# Patient Record
Sex: MALE | Race: BLACK OR AFRICAN AMERICAN | NOT HISPANIC OR LATINO
[De-identification: names, ages, dates, MRNs, and addresses within clinical notes are randomized per-mention and may not be internally consistent; named-entity substitution may affect disease eponyms.]

---

## 2021-08-03 ENCOUNTER — TRANSCRIPTION ENCOUNTER (OUTPATIENT)
Age: 56
End: 2021-08-03

## 2022-02-17 DIAGNOSIS — D12.6 BENIGN NEOPLASM OF COLON, UNSPECIFIED: ICD-10-CM

## 2022-11-03 ENCOUNTER — NON-APPOINTMENT (OUTPATIENT)
Age: 57
End: 2022-11-03

## 2022-11-11 ENCOUNTER — NON-APPOINTMENT (OUTPATIENT)
Age: 57
End: 2022-11-11

## 2022-11-18 ENCOUNTER — NON-APPOINTMENT (OUTPATIENT)
Age: 57
End: 2022-11-18

## 2022-12-31 ENCOUNTER — NON-APPOINTMENT (OUTPATIENT)
Age: 57
End: 2022-12-31

## 2023-07-25 ENCOUNTER — APPOINTMENT (OUTPATIENT)
Dept: ORTHOPEDIC SURGERY | Facility: CLINIC | Age: 58
End: 2023-07-25
Payer: COMMERCIAL

## 2023-07-25 DIAGNOSIS — S62.664A NONDISPLACED FRACTURE OF DISTAL PHALANX OF RIGHT RING FINGER, INITIAL ENCOUNTER FOR CLOSED FRACTURE: ICD-10-CM

## 2023-07-25 DIAGNOSIS — S62.636A DISPLACED FRACTURE OF DISTAL PHALANX OF RIGHT LITTLE FINGER, INITIAL ENCOUNTER FOR CLOSED FRACTURE: ICD-10-CM

## 2023-07-25 PROCEDURE — 73130 X-RAY EXAM OF HAND: CPT | Mod: RT

## 2023-07-25 PROCEDURE — 99203 OFFICE O/P NEW LOW 30 MIN: CPT | Mod: 25

## 2023-07-25 PROCEDURE — 29130 APPL FINGER SPLINT STATIC: CPT | Mod: RT

## 2023-07-25 NOTE — ASSESSMENT
[FreeTextEntry1] : Xrays reviewed with patient\par Treatment options discussed \par Placed in splint\par otc anti-inflammatories / tylenol as needed \par Follow up with hand specialist this week\par
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
## 2023-07-25 NOTE — PHYSICAL EXAM
[4th Finger] : 4th finger [5th Finger] : 5th finger [4th] : 4th [5th] : 5th [Proximal Phalanx] : proximal phalanx [DIP Joint] : DIP joint [Distal Phalanx] : distal phalanx [NL (75)] : Dorsiflexion 75 degrees [NL (85)] : volarflexion 85 degrees [5___] : volarflexion 5[unfilled]/5 [Right] : right hand [Fracture] : Fracture [] : no erythema [FreeTextEntry1] : 5th - avulsion fx distal phalanx\par 4th - ?nondisplaced distal phalanx fx?

## 2023-07-28 ENCOUNTER — APPOINTMENT (OUTPATIENT)
Dept: ORTHOPEDIC SURGERY | Facility: CLINIC | Age: 58
End: 2023-07-28
Payer: COMMERCIAL

## 2023-07-28 PROCEDURE — 99214 OFFICE O/P EST MOD 30 MIN: CPT

## 2023-07-28 PROCEDURE — 73140 X-RAY EXAM OF FINGER(S): CPT | Mod: RT

## 2023-07-28 NOTE — IMAGING
[de-identified] : RIGHT HAND\par skin intact. mild swelling of DIPJ of RF/SF.\par TTP to DIPJ of RF/SF.\par RF: good extension, flex to mid-palm.\par SF: DIPJ ext lag. flex to mid-palm.\par good flex/ext other digits. \par SILT to median, ulnar, radial distribution. \par brisk cap refill all digits.\par no triggering.\par \par \par XRAYS OF RIGHT HAND 7/25/23: displaced small finger distal phalanx dorsal base intra-articular fx. minimally displaced ring finger distal phalanx dorsal base fx. djd of thumb CMCJ. djd of DRUJ. well-corticated ossification adjacent to ulnar styloid\par XRAYS OF RIGHT RING FINGER: stable position/alignment of distal phalanx dorsal base intra-articular fx.\par XRAYS OF RIGHT SMALL FINGER: stable position/alignment of distal phalanx dorsal base intra-articular fx.
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
## 2023-07-28 NOTE — ASSESSMENT
[FreeTextEntry1] : The condition was explained to the patient.\par Discussed risks and benefits of surgical vs non-surgical treatment of mallet finger. Plan to proceed with non-surgical treatment.\par Discussed that there will be a permanent bump/deformity at the fracture site. Discussed risk of post-traumatic arthritis, which can cause pain, stiffness, weakness.\par Discussed possible outcomes - extension to neutral, no improvement in extensor lag, or partial improvement in extensor lag (most likely). Discussed risk of skin irritation, breakdown, and sensitivity as well as joint stiffness and loss of ROM due to prolonged immobilization.\par \par - Prescribed OT custom mallet splints for ring and small fingers. Applied stack splints (size 4 ring finger and small fiinger) in the interim. Anticipate 6wks full time, 4wks part time. Reviewed splint care and hygiene tips. - Demonstrated PIPJ flexion exercises to reduce adjacent joint stiffness.\par - Light activity.\par \par F/u 1 week. X-rays R ring and small fingers in splints.\par

## 2023-08-02 ENCOUNTER — APPOINTMENT (OUTPATIENT)
Dept: ORTHOPEDIC SURGERY | Facility: CLINIC | Age: 58
End: 2023-08-02
Payer: COMMERCIAL

## 2023-08-02 VITALS — BODY MASS INDEX: 29.82 KG/M2 | WEIGHT: 190 LBS | HEIGHT: 67 IN

## 2023-08-02 DIAGNOSIS — S62.636A DISPLACED FRACTURE OF DISTAL PHALANX OF RIGHT LITTLE FINGER, INITIAL ENCOUNTER FOR CLOSED FRACTURE: ICD-10-CM

## 2023-08-02 DIAGNOSIS — S62.634A DISPLACED FRACTURE OF DISTAL PHALANX OF RIGHT RING FINGER, INITIAL ENCOUNTER FOR CLOSED FRACTURE: ICD-10-CM

## 2023-08-02 PROCEDURE — 73140 X-RAY EXAM OF FINGER(S): CPT | Mod: RT

## 2023-08-02 PROCEDURE — 99213 OFFICE O/P EST LOW 20 MIN: CPT

## 2023-08-02 NOTE — ASSESSMENT
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 Reviewed splint care and hygiene tips. continue PIPJ flexion exercises to reduce adjacent joint stiffness. - Light activity.  F/u 5 weeks. X-rays R ring and small fingers out of splints.
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
## 2023-08-02 NOTE — IMAGING
[de-identified] : RIGHT HAND skin intact. mild swelling of DIPJ of RF/SF. RF: DIPJ in splint. PIPJ good flex/ext. SF: DIPJ in splint. PIPJ good flex/ext. good flex/ext other digits.  SILT to median, ulnar, radial distribution.  brisk cap refill all digits. no triggering.   XRAYS OF RIGHT RING FINGER: stable position/alignment of distal phalanx dorsal base intra-articular fx. concentric DIPJ. XRAYS OF RIGHT SMALL FINGER: stable position/alignment of distal phalanx dorsal base intra-articular fx. concentric DIPJ.

## 2023-08-22 ENCOUNTER — APPOINTMENT (OUTPATIENT)
Dept: ORTHOPEDIC SURGERY | Facility: CLINIC | Age: 58
End: 2023-08-22

## 2023-09-05 ENCOUNTER — APPOINTMENT (OUTPATIENT)
Dept: ORTHOPEDIC SURGERY | Facility: CLINIC | Age: 58
End: 2023-09-05

## 2023-09-05 VITALS — WEIGHT: 190 LBS | BODY MASS INDEX: 29.82 KG/M2 | HEIGHT: 67 IN

## 2023-09-06 ENCOUNTER — APPOINTMENT (OUTPATIENT)
Dept: ORTHOPEDIC SURGERY | Facility: CLINIC | Age: 58
End: 2023-09-06

## 2023-11-19 ENCOUNTER — APPOINTMENT (OUTPATIENT)
Dept: ORTHOPEDIC SURGERY | Facility: CLINIC | Age: 58
End: 2023-11-19
Payer: COMMERCIAL

## 2023-11-19 VITALS — HEIGHT: 67 IN | WEIGHT: 190 LBS | BODY MASS INDEX: 29.82 KG/M2

## 2023-11-19 PROCEDURE — 20610 DRAIN/INJ JOINT/BURSA W/O US: CPT | Mod: LT

## 2023-11-19 PROCEDURE — J3490M: CUSTOM

## 2023-11-19 PROCEDURE — 99203 OFFICE O/P NEW LOW 30 MIN: CPT | Mod: 25

## 2023-11-19 PROCEDURE — 73030 X-RAY EXAM OF SHOULDER: CPT | Mod: LT

## 2023-11-27 ENCOUNTER — APPOINTMENT (OUTPATIENT)
Dept: ORTHOPEDIC SURGERY | Facility: CLINIC | Age: 58
End: 2023-11-27

## 2023-12-24 ENCOUNTER — APPOINTMENT (OUTPATIENT)
Dept: ORTHOPEDIC SURGERY | Facility: CLINIC | Age: 58
End: 2023-12-24
Payer: COMMERCIAL

## 2023-12-24 VITALS — HEIGHT: 67 IN | WEIGHT: 190 LBS | BODY MASS INDEX: 29.82 KG/M2

## 2023-12-24 PROCEDURE — 99203 OFFICE O/P NEW LOW 30 MIN: CPT

## 2023-12-24 RX ORDER — MELOXICAM 15 MG/1
15 TABLET ORAL DAILY
Qty: 30 | Refills: 0 | Status: ACTIVE | COMMUNITY
Start: 2023-12-24 | End: 1900-01-01

## 2023-12-24 NOTE — ASSESSMENT
[FreeTextEntry1] : A/P L shoulder tendonitis - MRI r/o RC tear - NSAIDS prn - tylenol prn - ice - OT - WBAT - f/u shoulder surgery after MRI

## 2023-12-24 NOTE — IMAGING
[de-identified] : PE L shoulder: skin intact, +tenderness of anterior shoulder, AROM , abd 90, PROM FF 90, abd 90, RC intact but weak, biceps intact, NVI. no neck tenderness.

## 2023-12-24 NOTE — HISTORY OF PRESENT ILLNESS
[Left Arm] : left arm [Gradual] : gradual [6] : 6 [Localized] : localized [Constant] : constant [Injection therapy] : injection therapy [de-identified] : 57 y/o RHD M with atraumatic L shoulder pain x 2 days. denies fevers/chills. denies numbness/tingling. denies neck pain. He had shoulder tendonitis about 6 weeks ago with relief after cortisone injection. he did not do formal PT, but has been doing home exercises in gym. h/o GERD on famotidine [] : no [FreeTextEntry1] : shoulder  [de-identified] : motion [de-identified] : Gil [de-identified] : 11/19/23 [de-identified] : x-ray [de-identified] : cortisone injection

## 2023-12-30 ENCOUNTER — APPOINTMENT (OUTPATIENT)
Dept: ORTHOPEDIC SURGERY | Facility: CLINIC | Age: 58
End: 2023-12-30
Payer: COMMERCIAL

## 2023-12-30 VITALS — WEIGHT: 190 LBS | BODY MASS INDEX: 29.82 KG/M2 | HEIGHT: 67 IN

## 2023-12-30 DIAGNOSIS — M77.8 OTHER ENTHESOPATHIES, NOT ELSEWHERE CLASSIFIED: ICD-10-CM

## 2023-12-30 PROCEDURE — 99203 OFFICE O/P NEW LOW 30 MIN: CPT

## 2023-12-30 RX ORDER — METHYLPREDNISOLONE 4 MG/1
4 TABLET ORAL
Qty: 1 | Refills: 0 | Status: ACTIVE | COMMUNITY
Start: 2023-12-30 | End: 1900-01-01

## 2023-12-30 NOTE — IMAGING
[de-identified] :   ----------------------------------------------------------------------------   Left shoulder exam:    Skin: no significant pertinent finding  Inspection: no obvious deformity, no obvious masses, no swelling, no effusion, no atrophy  ROM:     FF: 170     ER: 70     IR: T12  Tenderness:     (+) Anterior/Biceps:     (neg) Posterior     (neg) Lateral     (neg) Trapezius     (neg) Scapula     (neg) AC joint     (neg) Crepitus with ROM  Additional tests:     (+) Neer's     (+)Hawkin's     (neg) Ross's     (neg) Speed     (neg) Cross chest adduction  Strength:     FF: 4/5     ER: 4/5     IR: 5/5     Biceps: 5/5     Triceps: 5/5     Distal: 5/5  Neuro: In tact to light touch throughout  Vascularity: Extremity warm and well perfused  [Left] : left shoulder [There are no fractures, subluxations or dislocations. No significant abnormalities are seen] : There are no fractures, subluxations or dislocations. 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

## 2023-12-30 NOTE — DISCUSSION/SUMMARY
[Medication Risks Reviewed] : Medication risks reviewed [de-identified] : MDP  fu p mri left shoulder  may also require mri CS if no cuff pathology seen ddx: brachial plexopathy  ----------------------------------------------------------------------------  All relevant imaging studies pertinent to today's visit, including x-rays, MRI's and/or other advanced imaging studies (CT/etc) were independently interpreted and reviewed with the patient as needed. Implications of the studies together with the patient's clinical picture were discussed to formulate a working diagnosis and management options were detailed.  The patient was advised of the diagnosis.  The natural history of the pathology was explained in full. All questions were answered.  The risks and benefits of conservative and interventional treatment alternatives were explained to the patient  ----------------------------------------------------------------------------  The patient was advised that an advanced diagnostic/imaging study (MRI/CT/etc) will be ordered to evaluate potential pathology in the affected area(s).  The patient was further advised to follow up in the office to review the results of the study and determine further management that may be indicated.

## 2023-12-30 NOTE — HISTORY OF PRESENT ILLNESS
[Left Arm] : left arm [7] : 7 [8] : 8 [Radiating] : radiating [Tingling] : tingling [Constant] : constant [Nothing helps with pain getting better] : Nothing helps with pain getting better [de-identified] : 12/30/23: pt is a 57 y/o male with pain in his left shoulder. 57 y/o RHD M with atraumatic L shoulder pain for a about a week. pt says pain has significantly worsened, especially at night. denies fevers/chills. pt says pain radiates down to his fingers where he feels numbness and tingling. denies neck pain.  no recent injury  He had shoulder tendonitis about 7 weeks ago with relief after cortisone injection. he did not do formal PT, but has been doing home exercises in gym. pt has been taking meloxicam and says it barely helps from UC that he visited 7 days ago h/o GERD on famotidine  hx of DELMY 15 years ago [] : no [FreeTextEntry1] : shoulder  [de-identified] : motion  [de-identified] : MINI Ash [de-identified] : x-ray  [de-identified] : meloxicam

## 2024-01-02 ENCOUNTER — APPOINTMENT (OUTPATIENT)
Dept: MRI IMAGING | Facility: CLINIC | Age: 59
End: 2024-01-02
Payer: COMMERCIAL

## 2024-01-02 PROCEDURE — 73221 MRI JOINT UPR EXTREM W/O DYE: CPT | Mod: LT

## 2024-01-03 ENCOUNTER — APPOINTMENT (OUTPATIENT)
Dept: ORTHOPEDIC SURGERY | Facility: CLINIC | Age: 59
End: 2024-01-03
Payer: COMMERCIAL

## 2024-01-03 VITALS — WEIGHT: 190 LBS | HEIGHT: 67 IN | BODY MASS INDEX: 29.82 KG/M2

## 2024-01-03 DIAGNOSIS — M75.122 COMPLETE ROTATOR CUFF TEAR OR RUPTURE OF LEFT SHOULDER, NOT SPECIFIED AS TRAUMATIC: ICD-10-CM

## 2024-01-03 PROCEDURE — 99214 OFFICE O/P EST MOD 30 MIN: CPT

## 2024-01-03 RX ORDER — DICLOFENAC SODIUM 75 MG/1
75 TABLET, DELAYED RELEASE ORAL
Qty: 30 | Refills: 0 | Status: ACTIVE | COMMUNITY
Start: 2024-01-03 | End: 1900-01-01

## 2024-01-03 NOTE — DISCUSSION/SUMMARY
[Medication Risks Reviewed] : Medication risks reviewed [de-identified] : Reviewed MRI Left shoulder, Pt does have significant supraspinatus tendinopathy and possible area of full tearing, however, given no injury and significant shoulder weakness and disfunction not fully explained  by MRI and out of proportion will rec EMG and MRI c spine prior to surgical repair for rotator cuff Obtain EMG Obtain MRI cervical spine f/u p MRI / EMG  ----------------------------------------------------------------------------  The patient was advised that an advanced diagnostic/imaging study (MRI/CT/etc) will be ordered to evaluate potential pathology in the affected area(s).  The patient was further advised to follow up in the office to review the results of the study and determine further management that may be indicated.  ----------------------------------------------------------------------------  All relevant imaging studies pertinent to today's visit, including x-rays, MRI's and/or other advanced imaging studies (CT/etc) were independently interpreted and reviewed with the patient as needed. Implications of the studies together with the patient's clinical picture were discussed to formulate a working diagnosis and management options were detailed.  The patient was advised of the diagnosis.  The natural history of the pathology was explained in full. All questions were answered.  The risks and benefits of conservative and interventional treatment alternatives were explained to the patient

## 2024-01-03 NOTE — HISTORY OF PRESENT ILLNESS
[Left Arm] : left arm [8] : 8 [7] : 7 [Radiating] : radiating [Tingling] : tingling [Constant] : constant [Nothing helps with pain getting better] : Nothing helps with pain getting better [de-identified] : 12/30/23: pt is a 59 y/o male with pain in his left shoulder. 59 y/o RHD M with atraumatic L shoulder pain for a about a week. pt says pain has significantly worsened, especially at night. denies fevers/chills. pt says pain radiates down to his fingers where he feels numbness and tingling. denies neck pain.  no recent injury  He had shoulder tendonitis about 7 weeks ago with relief after cortisone injection. he did not do formal PT, but has been doing home exercises in gym. pt has been taking meloxicam and says it barely helps from UC that he visited 7 days ago h/o GERD on famotidine  hx of DELMY 15 years ago [] : no [FreeTextEntry1] : shoulder  [de-identified] : motion  [de-identified] : MINI Ash [de-identified] : MRI [de-identified] : x-ray

## 2024-01-03 NOTE — DATA REVIEWED
[MRI] : MRI [Left] : left [Shoulder] : shoulder [I independently reviewed and interpreted images and report] : I independently reviewed and interpreted images and report [FreeTextEntry1] : moderate bursitis, severe rotator cuff tendinopathy, partial tearing.- ?near complete

## 2024-01-03 NOTE — IMAGING
[Left] : left shoulder [There are no fractures, subluxations or dislocations. No significant abnormalities are seen] : There are no fractures, subluxations or dislocations. No significant abnormalities are seen [de-identified] :   ----------------------------------------------------------------------------   Left shoulder exam:    Skin: no significant pertinent finding  Inspection: no obvious deformity, no obvious masses, no swelling, no effusion, no atrophy  ROM:     FF: 150 with pain     ER: 70     IR: T12  Tenderness:     (+) Anterior/Biceps:     (neg) Posterior     (neg) Lateral     (neg) Trapezius     (neg) Scapula     (neg) AC joint     (neg) Crepitus with ROM  Additional tests:     (+) Neer's     (+)Hawkin's     (neg) Ross's     (neg) Speed     (neg) Cross chest adduction  Strength:     FF: 4/5     ER: 4/5     IR: 5/5     Biceps: 5/5     Triceps: 5/5     Distal: 5/5  Neuro: In tact to light touch throughout  Vascularity: Extremity warm and well perfused  [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

## 2024-01-04 ENCOUNTER — APPOINTMENT (OUTPATIENT)
Dept: NEUROLOGY | Facility: CLINIC | Age: 59
End: 2024-01-04
Payer: COMMERCIAL

## 2024-01-04 PROCEDURE — 95886 MUSC TEST DONE W/N TEST COMP: CPT

## 2024-01-04 PROCEDURE — 95911 NRV CNDJ TEST 9-10 STUDIES: CPT

## 2024-01-05 ENCOUNTER — APPOINTMENT (OUTPATIENT)
Dept: MRI IMAGING | Facility: CLINIC | Age: 59
End: 2024-01-05
Payer: COMMERCIAL

## 2024-01-05 PROCEDURE — 72141 MRI NECK SPINE W/O DYE: CPT

## 2024-01-10 ENCOUNTER — APPOINTMENT (OUTPATIENT)
Dept: ORTHOPEDIC SURGERY | Facility: CLINIC | Age: 59
End: 2024-01-10
Payer: COMMERCIAL

## 2024-01-10 VITALS — WEIGHT: 190 LBS | HEIGHT: 67 IN | BODY MASS INDEX: 29.82 KG/M2

## 2024-01-10 PROCEDURE — 99215 OFFICE O/P EST HI 40 MIN: CPT

## 2024-01-10 NOTE — PHYSICAL EXAM
[Left] : left shoulder [4 ___] : forward flexion 4[unfilled]/5 [4___] : external rotation 4[unfilled]/5 [] : pain with strength testing [FreeTextEntry9] : FE 140P ER 45P

## 2024-01-10 NOTE — ASSESSMENT
[FreeTextEntry1] : L RCT. Cervical HNP with radiculopathy. xrays and advanced images reviewed. MRI L shoulder: full thickness supra tear, ac joint arthrosis, bursitis MRI C-spine: multi level disc bulge and herniation, central and foraminal stenosis EMG L UE: acute and chronic L C6-7 radiculopathy Discussed op versus non op tx, including the r/b/a/c of both. Discussed rehab and recovery after SA, SAD, acromioplasty, synovectomy, GHD, RCR. Discussed possible biceps tenodesis versus tenotomy pending evaluation intra op.  Recommend consult with Dr. Khanna and treatment of c-spine prior to proceeding with shoulder surgery.

## 2024-01-10 NOTE — HISTORY OF PRESENT ILLNESS
[10] : 10 [7] : 7 [Dull/Aching] : dull/aching [Sharp] : sharp [Shooting] : shooting [Stabbing] : stabbing [Tingling] : tingling [de-identified] : 1/10/24: 57 yo RHD male with left shoulder pain since about 2022. Denies specific injury but states he lifts weights at the gym. He has difficulty raising his arm overhead. There is pain deep in his shoulder. He does have neck injury which causes numbness/tingling down his arm to his hand. He had cervical KIRSTEN in the past. He saw Dr. Cardoso and had shoulder and c-spine MRIs and EMG. He had CSI L shoulder with good but temporary relief. He takes diclofenac.  PMHx: HCM  MRI L shoulder: full thickness supra tear, ac joint arthrosis, bursitis  MRI C-spine:multi level disc bulge and herniation, central and foraminal stenosis  EMG L UE: acute and chronic L C6-7 radiculopathy [FreeTextEntry1] : left shoulder

## 2024-01-10 NOTE — DATA REVIEWED
[EMG Nerve Conduction] : A EMG Nerve Conduction test was completed of the [Positive] : positive [MRI] : MRI [Left] : left [Shoulder] : shoulder [Cervical Spine] : cervical spine [FreeTextEntry1] : MRI L shoulder: full thickness supra tear, ac joint arthrosis, bursitis MRI C-spine:multi level disc bulge and herniation, central and foraminal stenosis

## 2024-01-11 ENCOUNTER — APPOINTMENT (OUTPATIENT)
Dept: PAIN MANAGEMENT | Facility: CLINIC | Age: 59
End: 2024-01-11
Payer: COMMERCIAL

## 2024-01-11 VITALS — BODY MASS INDEX: 29.82 KG/M2 | HEIGHT: 67 IN | WEIGHT: 190 LBS

## 2024-01-11 DIAGNOSIS — I42.1 OBSTRUCTIVE HYPERTROPHIC CARDIOMYOPATHY: ICD-10-CM

## 2024-01-11 DIAGNOSIS — M54.12 RADICULOPATHY, CERVICAL REGION: ICD-10-CM

## 2024-01-11 PROCEDURE — 99204 OFFICE O/P NEW MOD 45 MIN: CPT

## 2024-01-11 RX ORDER — GABAPENTIN 300 MG/1
300 CAPSULE ORAL
Qty: 60 | Refills: 2 | Status: ACTIVE | COMMUNITY
Start: 2024-01-11 | End: 1900-01-01

## 2024-01-11 NOTE — PHYSICAL EXAM
[] : no paracervical tenderness [Extension] : extension [3___] : left triceps 3[unfilled]/5 [de-identified] : extension 10 degrees

## 2024-01-11 NOTE — ASSESSMENT
[FreeTextEntry1] : After discussing various treatment options with the patient including but not limited to oral medications, physical therapy, exercise, modalities as well as interventional spinal injections, we have decided with the following plan:  1) Intervention Injection Therapy: I personally reviewed the MRI/CT scan images and agree with the radiologist's report. The radiological findings were discussed with the patient. The risks, benefits, contents and alternatives to injection were explained in full to the patient. Risks outlined include but are not limited to infection,sepsis, bleeding, post-dural puncture headache, nerve damage, temporary increase in pain, syncopal episode, failure to resolve symptoms, allergic reaction, symptom recurrence, and elevation of blood sugar in diabetics. Cortisone may cause immunosuppression. Patient understands the risks. All questions were answered. After discussion of options, patient requested an injection. Information regarding the injection was given to the patient. Which medications to stop prior to the injection was explained to the patient as well.  Follow up in 1-2 weeks post injection for re-evaluation.  Continue Home exercises, stretching, activity modification, physical therapy, and conservative care. Patient is presenting with acute/sub-acute radicular pain with impairment in ADLs and functionality.  The pain has not responded sufficiently to  conservative care including nsaid therapy and/or physical therapy.  There is no bleeding tendency, unstable medical condition, or systemic infection. The purpose of the spinal injections is to facilitate active therapy by providing short term relief through reduction of pain and inflammation.   Injections, by themselves, are not likely to provide long-term relief. Rather, active rehabilitation with modified work achieves long-term relief by increasing active ROM, strength and stability.   DELMY   2) I would recommend a trial of neuropathic medication as patient presents with signs of nerve irritation. (ie burning, paresthesias etc) Goals of therapy would be to improve pain and overall QOL. Side effects reviewed with patient. Patient will call or stop medication if given side effects occur.   gabapentin

## 2024-01-11 NOTE — HISTORY OF PRESENT ILLNESS
[Neck] : neck [8] : 8 [10] : 10 [Radiating] : radiating [Stabbing] : stabbing [Throbbing] : throbbing [Intermittent] : intermittent [Household chores] : household chores [Leisure] : leisure [Work] : work [Sleep] : sleep [Social interactions] : social interactions [Nothing helps with pain getting better] : Nothing helps with pain getting better [Lying in bed] : lying in bed [FreeTextEntry1] : 01/11/2024 : Patient presents for initial evaluation. He is having pain on his neck and left arm.   he noticed pain when he started getting his hair cut. gets a pulsating in the neck and down the left arm to the fingers. He knew he had a RTC issue as he could not lift his arm. He had a MRI and it revealed a tear.  Pain worse at night and wakes him up at night.   Subjective Weakness: Yes Numbness/Tingling: Yes  left hand  Bladder/Bowel dysfunction: No   Treatments Tried: Cortisone shot, pain medication  Attempted modalities for current pain complaint:  See above:    Medications: Yes  Injections: Yes- cortisonjackie CAPONE's (Dr. Lang)    Previous Spine Surgery: No      Imaging:  MRI Cervical Spine (1/4/24) - Straightening of lordosis multilevel loss of disc signal and height.  No fracture Schmorls node with Modic type 1 endplate change at inferior C4 C3-C4 Bulge C4-5 Broad bulge and broad herniation Impressing on the thecal sac and cord asymmetric to left of midline with central stenosis lecture hypertrophy and facet arthrosis with moderate foraminal stenosis C5-6 broad bulge spondylitic ridge and central and asymmetric to right herniation impressing on the thecal sac and anterior cord with central stenosis flexion hypertrophy and facet arthrosis with moderate left foraminal stenosis mild right foraminal stenosis C6-7 broad bulge impressing the thecal sac lecture hypertrophy and facet arthrosis with mild to moderate left foraminal stenosis and mild right foraminal stenosis C7-T1 broad bulge asymmetric to right herniation impressing the thecal sac with contact of cord and right-sided consent canal stenosis lecture hypertrophy and facet arthrosis with mild to moderate right foraminal stenosis [] : no [FreeTextEntry7] : LEFT ARM  [FreeTextEntry9] : cortisone shot [de-identified] : C MRI

## 2024-01-17 ENCOUNTER — APPOINTMENT (OUTPATIENT)
Dept: ORTHOPEDIC SURGERY | Facility: CLINIC | Age: 59
End: 2024-01-17
Payer: COMMERCIAL

## 2024-01-17 VITALS — BODY MASS INDEX: 29.82 KG/M2 | WEIGHT: 190 LBS | HEIGHT: 67 IN

## 2024-01-17 DIAGNOSIS — M54.12 RADICULOPATHY, CERVICAL REGION: ICD-10-CM

## 2024-01-17 DIAGNOSIS — R29.898 OTHER SYMPTOMS AND SIGNS INVOLVING THE MUSCULOSKELETAL SYSTEM: ICD-10-CM

## 2024-01-17 PROCEDURE — 99214 OFFICE O/P EST MOD 30 MIN: CPT

## 2024-01-17 NOTE — DISCUSSION/SUMMARY
[Medication Risks Reviewed] : Medication risks reviewed [de-identified] : Discussed options, f/u 2 weeks post c-spine inj will plan surgical management of shoulder thereafter or he may proceed with dr. bliss at his discretion   ----------------------------------------------------------------------------  All relevant imaging studies pertinent to today's visit, including x-rays, MRI's and/or other advanced imaging studies (CT/etc) were independently interpreted and reviewed with the patient as needed. Implications of the studies together with the patient's clinical picture were discussed to formulate a working diagnosis and management options were detailed.  The patient was advised of the diagnosis.  The natural history of the pathology was explained in full. All questions were answered.  The risks and benefits of conservative and interventional treatment alternatives were explained to the patient

## 2024-01-17 NOTE — DATA REVIEWED
[EMG Nerve Conduction] : A EMG Nerve Conduction test was completed of the [Positive] : positive [Left] : left [Shoulder] : shoulder [MRI] : MRI [Cervical Spine] : cervical spine [I independently reviewed and interpreted images and report] : I independently reviewed and interpreted images and report [FreeTextEntry1] : moderate bursitis, severe rotator cuff tendinopathy, partial tearing.- ?near complete [FreeTextEntry2] : Significant 5-6 djd, HNP R > L NF compression, 5-6 HNP central/B/L NF compression

## 2024-01-17 NOTE — HISTORY OF PRESENT ILLNESS
[Left Arm] : left arm [8] : 8 [7] : 7 [Dull/Aching] : dull/aching [Radiating] : radiating [Sharp] : sharp [Shooting] : shooting [Stabbing] : stabbing [Tingling] : tingling [Constant] : constant [Nothing helps with pain getting better] : Nothing helps with pain getting better [de-identified] : 12/30/23: pt is a 59 y/o male with pain in his left shoulder. 59 y/o RHD M with atraumatic L shoulder pain for a about a week. pt says pain has significantly worsened, especially at night. denies fevers/chills. pt says pain radiates down to his fingers where he feels numbness and tingling. denies neck pain.  no recent injury  He had shoulder tendonitis about 7 weeks ago with relief after cortisone injection. he did not do formal PT, but has been doing home exercises in gym. pt has been taking meloxicam and says it barely helps from UC that he visited 7 days ago h/o GERD on famotidine  hx of DELMY 15 years ago [] : no [FreeTextEntry1] : shoulder  [de-identified] : motion  [de-identified] : MINI Ash [de-identified] : x-ray  [de-identified] : MRI

## 2024-01-17 NOTE — IMAGING
[Left] : left shoulder [There are no fractures, subluxations or dislocations. No significant abnormalities are seen] : There are no fractures, subluxations or dislocations. No significant abnormalities are seen [de-identified] :   ----------------------------------------------------------------------------   Left shoulder exam:    Skin: no significant pertinent finding  Inspection: no obvious deformity, no obvious masses, no swelling, no effusion, no atrophy  ROM:     FF: 150 with pain     ER: 70     IR: T12  Tenderness:     (+) Anterior/Biceps:     (neg) Posterior     (neg) Lateral     (neg) Trapezius     (neg) Scapula     (neg) AC joint     (neg) Crepitus with ROM  Additional tests:     (+) Neer's     (+)Hawkin's     (neg) Ross's     (neg) Speed     (neg) Cross chest adduction  Strength:     FF: 4/5     ER: 4/5     IR: 5/5     Biceps: 5/5     Triceps: 5/5     Distal: 5/5  Neuro: In tact to light touch throughout  Vascularity: Extremity warm and well perfused  [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

## 2024-01-26 ENCOUNTER — APPOINTMENT (OUTPATIENT)
Age: 59
End: 2024-01-26
Payer: COMMERCIAL

## 2024-01-26 PROCEDURE — 62321 NJX INTERLAMINAR CRV/THRC: CPT

## 2024-02-05 ENCOUNTER — APPOINTMENT (OUTPATIENT)
Dept: PAIN MANAGEMENT | Facility: CLINIC | Age: 59
End: 2024-02-05

## 2024-02-05 NOTE — HISTORY OF PRESENT ILLNESS
[Neck] : neck [8] : 8 [10] : 10 [Radiating] : radiating [Stabbing] : stabbing [Throbbing] : throbbing [Intermittent] : intermittent [Household chores] : household chores [Leisure] : leisure [Work] : work [Sleep] : sleep [Social interactions] : social interactions [Nothing helps with pain getting better] : Nothing helps with pain getting better [Lying in bed] : lying in bed [FreeTextEntry1] : 02/05/2024 Follow up today after C7-T1 DELMY on 01/26/24 with % relief  01/11/2024 : Patient presents for initial evaluation. He is having pain on his neck and left arm.   he noticed pain when he started getting his hair cut. gets a pulsating in the neck and down the left arm to the fingers. He knew he had a RTC issue as he could not lift his arm. He had a MRI and it revealed a tear.  Pain worse at night and wakes him up at night.   Subjective Weakness: Yes Numbness/Tingling: Yes  left hand  Bladder/Bowel dysfunction: No   Treatments Tried: Cortisone shot, pain medication  Attempted modalities for current pain complaint:  See above:    Medications: Yes  Injections: Yes- cortisone  LESI's (Dr. Lang)    Previous Spine Surgery: No      Imaging:  MRI Cervical Spine (1/4/24) - Straightening of lordosis multilevel loss of disc signal and height.  No fracture Schmorls node with Modic type 1 endplate change at inferior C4 C3-C4 Bulge C4-5 Broad bulge and broad herniation Impressing on the thecal sac and cord asymmetric to left of midline with central stenosis lecture hypertrophy and facet arthrosis with moderate foraminal stenosis C5-6 broad bulge spondylitic ridge and central and asymmetric to right herniation impressing on the thecal sac and anterior cord with central stenosis flexion hypertrophy and facet arthrosis with moderate left foraminal stenosis mild right foraminal stenosis C6-7 broad bulge impressing the thecal sac lecture hypertrophy and facet arthrosis with mild to moderate left foraminal stenosis and mild right foraminal stenosis C7-T1 broad bulge asymmetric to right herniation impressing the thecal sac with contact of cord and right-sided consent canal stenosis lecture hypertrophy and facet arthrosis with mild to moderate right foraminal stenosis [] : no [FreeTextEntry7] : LEFT ARM  [FreeTextEntry9] : cortisone shot [de-identified] : C MRI

## 2024-02-05 NOTE — PHYSICAL EXAM
[Extension] : extension [3___] : left triceps 3[unfilled]/5 [] : no paracervical tenderness [de-identified] : extension 10 degrees

## 2024-02-14 ENCOUNTER — APPOINTMENT (OUTPATIENT)
Dept: ORTHOPEDIC SURGERY | Facility: CLINIC | Age: 59
End: 2024-02-14
Payer: COMMERCIAL

## 2024-02-14 VITALS — WEIGHT: 190 LBS | HEIGHT: 67 IN | BODY MASS INDEX: 29.82 KG/M2

## 2024-02-14 PROCEDURE — 99214 OFFICE O/P EST MOD 30 MIN: CPT | Mod: 57

## 2024-02-14 NOTE — ASSESSMENT
[FreeTextEntry1] : L RCT. Cervical HNP with radiculopathy. xrays and advanced images reviewed. MRI L shoulder: full thickness supra tear, ac joint arthrosis, bursitis MRI C-spine: multi level disc bulge and herniation, central and foraminal stenosis EMG L UE: acute and chronic L C6-7 radiculopathy Discussed op versus non op tx, including the r/b/a/c of both. Discussed rehab and recovery after SA, SAD, acromioplasty, synovectomy, GHD, RCR. Discussed possible biceps tenodesis versus tenotomy pending evaluation intra op.  He saw Dr. Khanna for c-spine. Again reviewed pre and post op course of RCR.  He would like to proceed with surgery.  Risks: The advantages, disadvantages, complications and alternatives of continued non-operative treatment versus operative treatment were discussed with the patient.   We specifically discussed the risks of bleeding, infection, damage to neurovascular structures, failure of wound healing, wound dehiscence, scaring, failure of repair, need for revision surgery, shoulder pain, shoulder stiffness, shoulder arthritis and complications of surgery and anesthesia in general including deep venous thrombosis, pulmonary embolism, myocardial infarction, stroke, loss of limb and death.  The patient understood and agreed to proceed.

## 2024-02-14 NOTE — HISTORY OF PRESENT ILLNESS
[de-identified] : 2/14/24: Here for follow up. He reports continued pain in his shoulder. He saw Dr. Khanna and had DELMY with temporary relief.   1/10/24: 57 yo RHD male with left shoulder pain since about 2022. Denies specific injury but states he lifts weights at the gym. He has difficulty raising his arm overhead. There is pain deep in his shoulder. He does have neck injury which causes numbness/tingling down his arm to his hand. He had cervical KIRSTEN in the past. He saw Dr. Cardoso and had shoulder and c-spine MRIs and EMG. He had CSI L shoulder with good but temporary relief. He takes diclofenac.  PMHx: HCM  MRI L shoulder: full thickness supra tear, ac joint arthrosis, bursitis  MRI C-spine:multi level disc bulge and herniation, central and foraminal stenosis  EMG L UE: acute and chronic L C6-7 radiculopathy

## 2024-03-14 ENCOUNTER — APPOINTMENT (OUTPATIENT)
Dept: PAIN MANAGEMENT | Facility: CLINIC | Age: 59
End: 2024-03-14

## 2024-03-14 NOTE — PHYSICAL EXAM
[3___] : left triceps 3[unfilled]/5 [Extension] : extension [] : no paracervical tenderness [de-identified] : extension 10 degrees

## 2024-03-14 NOTE — HISTORY OF PRESENT ILLNESS
[Neck] : neck [8] : 8 [10] : 10 [Radiating] : radiating [Throbbing] : throbbing [Stabbing] : stabbing [Intermittent] : intermittent [Household chores] : household chores [Leisure] : leisure [Work] : work [Sleep] : sleep [Nothing helps with pain getting better] : Nothing helps with pain getting better [Social interactions] : social interactions [Lying in bed] : lying in bed [FreeTextEntry1] : 03/14/2024: follow up today for DELMY on 1/26 01/11/2024 : Patient presents for initial evaluation. He is having pain on his neck and left arm.   he noticed pain when he started getting his hair cut. gets a pulsating in the neck and down the left arm to the fingers. He knew he had a RTC issue as he could not lift his arm. He had a MRI and it revealed a tear.  Pain worse at night and wakes him up at night.   Subjective Weakness: Yes Numbness/Tingling: Yes  left hand  Bladder/Bowel dysfunction: No   Treatments Tried: Cortisone shot, pain medication  Attempted modalities for current pain complaint:  See above:    Medications: Yes  Injections: Yes- cortisone  LESI's (Dr. Lang)   DELMY 1/26/24  Previous Spine Surgery: No      Imaging:  MRI Cervical Spine (1/4/24) - Straightening of lordosis multilevel loss of disc signal and height.  No fracture Schmorls node with Modic type 1 endplate change at inferior C4 C3-C4 Bulge C4-5 Broad bulge and broad herniation Impressing on the thecal sac and cord asymmetric to left of midline with central stenosis lecture hypertrophy and facet arthrosis with moderate foraminal stenosis C5-6 broad bulge spondylitic ridge and central and asymmetric to right herniation impressing on the thecal sac and anterior cord with central stenosis flexion hypertrophy and facet arthrosis with moderate left foraminal stenosis mild right foraminal stenosis C6-7 broad bulge impressing the thecal sac lecture hypertrophy and facet arthrosis with mild to moderate left foraminal stenosis and mild right foraminal stenosis C7-T1 broad bulge asymmetric to right herniation impressing the thecal sac with contact of cord and right-sided consent canal stenosis lecture hypertrophy and facet arthrosis with mild to moderate right foraminal stenosis [] : no [FreeTextEntry7] : LEFT ARM  [FreeTextEntry9] : cortisone shot [de-identified] : C MRI

## 2024-03-14 NOTE — PHYSICAL EXAM
[Extension] : extension [3___] : left triceps 3[unfilled]/5 [] : no paracervical spasm [de-identified] : extension 10 degrees

## 2024-03-14 NOTE — HISTORY OF PRESENT ILLNESS
[Neck] : neck [8] : 8 [10] : 10 [Radiating] : radiating [Stabbing] : stabbing [Throbbing] : throbbing [Intermittent] : intermittent [Household chores] : household chores [Leisure] : leisure [Work] : work [Sleep] : sleep [Nothing helps with pain getting better] : Nothing helps with pain getting better [Social interactions] : social interactions [Lying in bed] : lying in bed [FreeTextEntry1] : 03/14/2024: follow up today for DELMY on 1/26 01/11/2024 : Patient presents for initial evaluation. He is having pain on his neck and left arm.   he noticed pain when he started getting his hair cut. gets a pulsating in the neck and down the left arm to the fingers. He knew he had a RTC issue as he could not lift his arm. He had a MRI and it revealed a tear.  Pain worse at night and wakes him up at night.   Subjective Weakness: Yes Numbness/Tingling: Yes  left hand  Bladder/Bowel dysfunction: No   Treatments Tried: Cortisone shot, pain medication  Attempted modalities for current pain complaint:  See above:    Medications: Yes  Injections: Yes- cortisone  LESI's (Dr. Lang)   DELMY 1/26/24  Previous Spine Surgery: No      Imaging:  MRI Cervical Spine (1/4/24) - Straightening of lordosis multilevel loss of disc signal and height.  No fracture Schmorls node with Modic type 1 endplate change at inferior C4 C3-C4 Bulge C4-5 Broad bulge and broad herniation Impressing on the thecal sac and cord asymmetric to left of midline with central stenosis lecture hypertrophy and facet arthrosis with moderate foraminal stenosis C5-6 broad bulge spondylitic ridge and central and asymmetric to right herniation impressing on the thecal sac and anterior cord with central stenosis flexion hypertrophy and facet arthrosis with moderate left foraminal stenosis mild right foraminal stenosis C6-7 broad bulge impressing the thecal sac lecture hypertrophy and facet arthrosis with mild to moderate left foraminal stenosis and mild right foraminal stenosis C7-T1 broad bulge asymmetric to right herniation impressing the thecal sac with contact of cord and right-sided consent canal stenosis lecture hypertrophy and facet arthrosis with mild to moderate right foraminal stenosis [] : no [FreeTextEntry7] : LEFT ARM  [FreeTextEntry9] : cortisone shot [de-identified] : C MRI

## 2024-03-22 ENCOUNTER — APPOINTMENT (OUTPATIENT)
Dept: ORTHOPEDIC SURGERY | Facility: CLINIC | Age: 59
End: 2024-03-22

## 2024-04-08 ENCOUNTER — OUTPATIENT (OUTPATIENT)
Dept: OUTPATIENT SERVICES | Facility: HOSPITAL | Age: 59
LOS: 1 days | Discharge: HOME | End: 2024-04-08
Payer: COMMERCIAL

## 2024-04-08 VITALS
TEMPERATURE: 98 F | SYSTOLIC BLOOD PRESSURE: 133 MMHG | OXYGEN SATURATION: 99 % | DIASTOLIC BLOOD PRESSURE: 82 MMHG | RESPIRATION RATE: 17 BRPM | WEIGHT: 179.46 LBS | HEIGHT: 67 IN

## 2024-04-08 DIAGNOSIS — Z01.818 ENCOUNTER FOR OTHER PREPROCEDURAL EXAMINATION: ICD-10-CM

## 2024-04-08 DIAGNOSIS — Z98.890 OTHER SPECIFIED POSTPROCEDURAL STATES: Chronic | ICD-10-CM

## 2024-04-08 DIAGNOSIS — M75.102 UNSPECIFIED ROTATOR CUFF TEAR OR RUPTURE OF LEFT SHOULDER, NOT SPECIFIED AS TRAUMATIC: ICD-10-CM

## 2024-04-08 DIAGNOSIS — M75.122 COMPLETE ROTATOR CUFF TEAR OR RUPTURE OF LEFT SHOULDER, NOT SPECIFIED AS TRAUMATIC: ICD-10-CM

## 2024-04-08 DIAGNOSIS — I42.1 OBSTRUCTIVE HYPERTROPHIC CARDIOMYOPATHY: ICD-10-CM

## 2024-04-08 LAB
ANION GAP SERPL CALC-SCNC: 9 MMOL/L — SIGNIFICANT CHANGE UP (ref 5–17)
BASOPHILS # BLD AUTO: 0.04 K/UL — SIGNIFICANT CHANGE UP (ref 0–0.2)
BASOPHILS NFR BLD AUTO: 0.7 % — SIGNIFICANT CHANGE UP (ref 0–2)
BUN SERPL-MCNC: 11 MG/DL — SIGNIFICANT CHANGE UP (ref 7–23)
CALCIUM SERPL-MCNC: 9.2 MG/DL — SIGNIFICANT CHANGE UP (ref 8.5–10.1)
CHLORIDE SERPL-SCNC: 110 MMOL/L — HIGH (ref 96–108)
CO2 SERPL-SCNC: 26 MMOL/L — SIGNIFICANT CHANGE UP (ref 22–31)
CREAT SERPL-MCNC: 1.28 MG/DL — SIGNIFICANT CHANGE UP (ref 0.5–1.3)
EGFR: 65 ML/MIN/1.73M2 — SIGNIFICANT CHANGE UP
EOSINOPHIL # BLD AUTO: 0.3 K/UL — SIGNIFICANT CHANGE UP (ref 0–0.5)
EOSINOPHIL NFR BLD AUTO: 5.4 % — SIGNIFICANT CHANGE UP (ref 0–6)
GLUCOSE SERPL-MCNC: 73 MG/DL — SIGNIFICANT CHANGE UP (ref 70–99)
HCT VFR BLD CALC: 42.2 % — SIGNIFICANT CHANGE UP (ref 39–50)
HGB BLD-MCNC: 14.2 G/DL — SIGNIFICANT CHANGE UP (ref 13–17)
IMM GRANULOCYTES NFR BLD AUTO: 0.5 % — SIGNIFICANT CHANGE UP (ref 0–0.9)
LYMPHOCYTES # BLD AUTO: 2.1 K/UL — SIGNIFICANT CHANGE UP (ref 1–3.3)
LYMPHOCYTES # BLD AUTO: 37.8 % — SIGNIFICANT CHANGE UP (ref 13–44)
MCHC RBC-ENTMCNC: 30.1 PG — SIGNIFICANT CHANGE UP (ref 27–34)
MCHC RBC-ENTMCNC: 33.6 G/DL — SIGNIFICANT CHANGE UP (ref 32–36)
MCV RBC AUTO: 89.4 FL — SIGNIFICANT CHANGE UP (ref 80–100)
MONOCYTES # BLD AUTO: 0.69 K/UL — SIGNIFICANT CHANGE UP (ref 0–0.9)
MONOCYTES NFR BLD AUTO: 12.4 % — SIGNIFICANT CHANGE UP (ref 2–14)
NEUTROPHILS # BLD AUTO: 2.4 K/UL — SIGNIFICANT CHANGE UP (ref 1.8–7.4)
NEUTROPHILS NFR BLD AUTO: 43.2 % — SIGNIFICANT CHANGE UP (ref 43–77)
NRBC # BLD: 0 /100 WBCS — SIGNIFICANT CHANGE UP (ref 0–0)
PLATELET # BLD AUTO: 296 K/UL — SIGNIFICANT CHANGE UP (ref 150–400)
POTASSIUM SERPL-MCNC: 3.7 MMOL/L — SIGNIFICANT CHANGE UP (ref 3.5–5.3)
POTASSIUM SERPL-SCNC: 3.7 MMOL/L — SIGNIFICANT CHANGE UP (ref 3.5–5.3)
RBC # BLD: 4.72 M/UL — SIGNIFICANT CHANGE UP (ref 4.2–5.8)
RBC # FLD: 13 % — SIGNIFICANT CHANGE UP (ref 10.3–14.5)
SODIUM SERPL-SCNC: 145 MMOL/L — SIGNIFICANT CHANGE UP (ref 135–145)
WBC # BLD: 5.56 K/UL — SIGNIFICANT CHANGE UP (ref 3.8–10.5)
WBC # FLD AUTO: 5.56 K/UL — SIGNIFICANT CHANGE UP (ref 3.8–10.5)

## 2024-04-08 PROCEDURE — 93010 ELECTROCARDIOGRAM REPORT: CPT

## 2024-04-08 NOTE — H&P PST ADULT - ASSESSMENT
left shoulders rotator cuff tear   CAPRINI SCORE    AGE RELATED RISK FACTORS                                                       MOBILITY RELATED FACTORS  [x ] Age 41-60 years                                            (1 Point)                  [ ] Bed rest                                                        (1 Point)  [ ] Age: 61-74 years                                           (2 Points)                [ ] Plaster cast                                                   (2 Points)  [ ] Age= 75 years                                              (3 Points)                 [ ] Bed bound for more than 72 hours                   (2 Points)    DISEASE RELATED RISK FACTORS                                               GENDER SPECIFIC FACTORS  [ ] Edema in the lower extremities                       (1 Point)                  [ ] Pregnancy                                                     (1 Point)  [ ] Varicose veins                                               (1 Point)                  [ ] Post-partum < 6 weeks                                   (1 Point)             x[ ] BMI > 25 Kg/m2                                            (1 Point)                  [ ] Hormonal therapy  or oral contraception            (1 Point)                 [ ] Sepsis (in the previous month)                        (1 Point)                  [ ] History of pregnancy complications  [ ] Pneumonia or serious lung disease                                               [ ] Unexplained or recurrent                       (1 Point)           (in the previous month)                               (1 Point)  [ ] Abnormal pulmonary function test                     (1 Point)                 SURGERY RELATED RISK FACTORS  [ ] Acute myocardial infarction                              (1 Point)                 [ ]  Section                                            (1 Point)  [ ] Congestive heart failure (in the previous month)  (1 Point)                 [ ] Minor surgery                                                 (1 Point)   [ ] Inflammatory bowel disease                             (1 Point)                 [x ] Arthroscopic surgery                                        (2 Points)  [ ] Central venous access                                    (2 Points)                [ ] General surgery lasting more than 45 minutes   (2 Points)       [ ] Stroke (in the previous month)                          (5 Points)               [ ] Elective arthroplasty                                        (5 Points)                                                                                                                                               HEMATOLOGY RELATED FACTORS                                                 TRAUMA RELATED RISK FACTORS  [ ] Prior episodes of VTE                                     (3 Points)                 [ ] Fracture of the hip, pelvis, or leg                       (5 Points)  [ ] Positive family history for VTE                         (3 Points)                 [ ] Acute spinal cord injury (in the previous month)  (5 Points)  [ ] Prothrombin 30615 A                                      (3 Points)                 [ ] Paralysis  (less than 1 month)                          (5 Points)  [ ] Factor V Leiden                                             (3 Points)                 [ ] Multiple Trauma within 1 month                         (5 Points)  [ ] Lupus anticoagulants                                     (3 Points)                                                           [ ] Anticardiolipin antibodies                                (3 Points)                                                       [ ] High homocysteine in the blood                      (3 Points)                                             [ ] Other congenital or acquired thrombophilia       (3 Points)                                                [ ] Heparin induced thrombocytopenia                  (3 Points)                                          Total Score [      4    ]  Caprini Score 0-2: Low risk, No VTE Prophylaxis required for most patient's, encourage ambulation  Caprini Score 3-6: At Risk, Pharmacologic VTE prophylaxis is indicated for most patients ( in the absence of a contraindication)  Caprini Score Greater than or = 7: High Risk , pharmacologic VTE is indicated for most patients ( in the absence of a contraindication)    Caprini score indicates that the patient is high risk for VTE event ( score 6 or greater). Surgical patient's in this group will benefit from both pharmacologic prophylaxis and intermittent compression devices . Surgical team will determine the balance between VTE  risk and bleeding risk and other clinical considerations

## 2024-04-08 NOTE — H&P PST ADULT - HISTORY OF PRESENT ILLNESS
59 yo male c/o left shoulder pain 2/2 rotator cuff tear - scheduled for left shoulder arthroscopy  denies recent travels in the past 30 days. No fever, SOB, cough, flu like symptoms or body rash- covid screen

## 2024-04-17 ENCOUNTER — APPOINTMENT (OUTPATIENT)
Dept: ORTHOPEDIC SURGERY | Facility: CLINIC | Age: 59
End: 2024-04-17
Payer: COMMERCIAL

## 2024-04-17 PROBLEM — Z87.898 PERSONAL HISTORY OF OTHER SPECIFIED CONDITIONS: Chronic | Status: ACTIVE | Noted: 2024-04-08

## 2024-04-17 PROBLEM — I42.1 OBSTRUCTIVE HYPERTROPHIC CARDIOMYOPATHY: Chronic | Status: ACTIVE | Noted: 2024-04-08

## 2024-04-17 PROCEDURE — A4565: CPT | Mod: LT

## 2024-04-21 ENCOUNTER — TRANSCRIPTION ENCOUNTER (OUTPATIENT)
Age: 59
End: 2024-04-21

## 2024-04-22 ENCOUNTER — APPOINTMENT (OUTPATIENT)
Dept: ORTHOPEDIC SURGERY | Facility: HOSPITAL | Age: 59
End: 2024-04-22
Payer: COMMERCIAL

## 2024-04-22 ENCOUNTER — TRANSCRIPTION ENCOUNTER (OUTPATIENT)
Age: 59
End: 2024-04-22

## 2024-04-22 ENCOUNTER — OUTPATIENT (OUTPATIENT)
Dept: OUTPATIENT SERVICES | Facility: HOSPITAL | Age: 59
LOS: 1 days | Discharge: HOME | End: 2024-04-22

## 2024-04-22 VITALS
OXYGEN SATURATION: 100 % | SYSTOLIC BLOOD PRESSURE: 109 MMHG | HEART RATE: 67 BPM | DIASTOLIC BLOOD PRESSURE: 55 MMHG | RESPIRATION RATE: 14 BRPM | TEMPERATURE: 98 F

## 2024-04-22 VITALS
SYSTOLIC BLOOD PRESSURE: 125 MMHG | HEIGHT: 67 IN | WEIGHT: 179.9 LBS | DIASTOLIC BLOOD PRESSURE: 84 MMHG | OXYGEN SATURATION: 99 % | HEART RATE: 80 BPM | RESPIRATION RATE: 18 BRPM | TEMPERATURE: 99 F

## 2024-04-22 DIAGNOSIS — Z98.890 OTHER SPECIFIED POSTPROCEDURAL STATES: Chronic | ICD-10-CM

## 2024-04-22 PROCEDURE — 29823 SHO ARTHRS SRG XTNSV DBRDMT: CPT | Mod: AS,LT

## 2024-04-22 PROCEDURE — 29825 SHO ARTHRS SRG LSS&RESCJ ADS: CPT | Mod: 59,LT

## 2024-04-22 PROCEDURE — 29826 SHO ARTHRS SRG DECOMPRESSION: CPT | Mod: AS,LT

## 2024-04-22 PROCEDURE — 29826 SHO ARTHRS SRG DECOMPRESSION: CPT | Mod: LT

## 2024-04-22 PROCEDURE — 29820 SHO ARTHRS SRG PRTL SYNVCT: CPT | Mod: AS,59,LT

## 2024-04-22 PROCEDURE — 29820 SHO ARTHRS SRG PRTL SYNVCT: CPT | Mod: 59,LT

## 2024-04-22 PROCEDURE — 29823 SHO ARTHRS SRG XTNSV DBRDMT: CPT | Mod: LT

## 2024-04-22 PROCEDURE — 29825 SHO ARTHRS SRG LSS&RESCJ ADS: CPT | Mod: AS,59,LT

## 2024-04-22 RX ORDER — HYDROMORPHONE HYDROCHLORIDE 2 MG/ML
1 INJECTION INTRAMUSCULAR; INTRAVENOUS; SUBCUTANEOUS
Refills: 0 | Status: DISCONTINUED | OUTPATIENT
Start: 2024-04-22 | End: 2024-04-23

## 2024-04-22 RX ORDER — GABAPENTIN 400 MG/1
1 CAPSULE ORAL
Refills: 0 | DISCHARGE

## 2024-04-22 RX ORDER — DOCUSATE SODIUM 100 MG/1
100 CAPSULE ORAL EVERY 8 HOURS
Qty: 21 | Refills: 0 | Status: ACTIVE | COMMUNITY
Start: 2024-04-22 | End: 1900-01-01

## 2024-04-22 RX ORDER — VERAPAMIL HCL 240 MG
1 CAPSULE, EXTENDED RELEASE PELLETS 24 HR ORAL
Refills: 0 | DISCHARGE

## 2024-04-22 RX ORDER — SODIUM CHLORIDE 9 MG/ML
1000 INJECTION, SOLUTION INTRAVENOUS
Refills: 0 | Status: DISCONTINUED | OUTPATIENT
Start: 2024-04-22 | End: 2024-04-23

## 2024-04-22 RX ORDER — HYDROMORPHONE HYDROCHLORIDE 2 MG/ML
0.5 INJECTION INTRAMUSCULAR; INTRAVENOUS; SUBCUTANEOUS
Refills: 0 | Status: DISCONTINUED | OUTPATIENT
Start: 2024-04-22 | End: 2024-04-23

## 2024-04-22 RX ORDER — SODIUM CHLORIDE 9 MG/ML
3 INJECTION INTRAMUSCULAR; INTRAVENOUS; SUBCUTANEOUS EVERY 8 HOURS
Refills: 0 | Status: DISCONTINUED | OUTPATIENT
Start: 2024-04-22 | End: 2024-04-22

## 2024-04-22 RX ORDER — OXYCODONE 5 MG/1
5 TABLET ORAL
Qty: 35 | Refills: 0 | Status: ACTIVE | COMMUNITY
Start: 2024-04-22 | End: 1900-01-01

## 2024-04-22 RX ADMIN — SODIUM CHLORIDE 75 MILLILITER(S): 9 INJECTION, SOLUTION INTRAVENOUS at 14:09

## 2024-04-22 NOTE — ASU DISCHARGE PLAN (ADULT/PEDIATRIC) - PROCEDURE
S/p left shoulder arthroscopic rotator cuff repair S/p left shoulder arthroscopic rotator cuff debridement

## 2024-04-22 NOTE — BRIEF OPERATIVE NOTE - OPERATION/FINDINGS
See op report    S/P Left shoulder arthropscopic glenohumeral debridement, subacromial decompression, rotator cuff debridement

## 2024-04-22 NOTE — BRIEF OPERATIVE NOTE - NSICDXBRIEFPROCEDURE_GEN_ALL_CORE_FT
PROCEDURES:  Arthroscopic partial debridement of shoulder 22-Apr-2024 13:56:56  Angelica Hightower

## 2024-04-25 ENCOUNTER — TRANSCRIPTION ENCOUNTER (OUTPATIENT)
Age: 59
End: 2024-04-25

## 2024-04-26 ENCOUNTER — APPOINTMENT (OUTPATIENT)
Dept: ORTHOPEDIC SURGERY | Facility: CLINIC | Age: 59
End: 2024-04-26
Payer: COMMERCIAL

## 2024-04-26 VITALS
BODY MASS INDEX: 27.47 KG/M2 | HEART RATE: 81 BPM | DIASTOLIC BLOOD PRESSURE: 86 MMHG | SYSTOLIC BLOOD PRESSURE: 137 MMHG | OXYGEN SATURATION: 98 % | HEIGHT: 67 IN | TEMPERATURE: 97.8 F | WEIGHT: 175 LBS

## 2024-04-26 PROCEDURE — 99203 OFFICE O/P NEW LOW 30 MIN: CPT

## 2024-04-27 DIAGNOSIS — M75.122 COMPLETE ROTATOR CUFF TEAR OR RUPTURE OF LEFT SHOULDER, NOT SPECIFIED AS TRAUMATIC: ICD-10-CM

## 2024-04-27 DIAGNOSIS — M75.52 BURSITIS OF LEFT SHOULDER: ICD-10-CM

## 2024-04-27 DIAGNOSIS — M75.02 ADHESIVE CAPSULITIS OF LEFT SHOULDER: ICD-10-CM

## 2024-04-27 DIAGNOSIS — M67.912 UNSPECIFIED DISORDER OF SYNOVIUM AND TENDON, LEFT SHOULDER: ICD-10-CM

## 2024-04-30 ENCOUNTER — NON-APPOINTMENT (OUTPATIENT)
Age: 59
End: 2024-04-30

## 2024-05-01 ENCOUNTER — APPOINTMENT (OUTPATIENT)
Dept: ORTHOPEDIC SURGERY | Facility: CLINIC | Age: 59
End: 2024-05-01
Payer: COMMERCIAL

## 2024-05-01 VITALS — BODY MASS INDEX: 27.47 KG/M2 | HEIGHT: 67 IN | WEIGHT: 175 LBS

## 2024-05-01 VITALS — HEIGHT: 67 IN | BODY MASS INDEX: 27.47 KG/M2 | WEIGHT: 175 LBS

## 2024-05-01 DIAGNOSIS — M67.912 UNSPECIFIED DISORDER OF SYNOVIUM AND TENDON, LEFT SHOULDER: ICD-10-CM

## 2024-05-01 PROCEDURE — 99024 POSTOP FOLLOW-UP VISIT: CPT

## 2024-05-01 NOTE — ASSESSMENT
[FreeTextEntry1] : S/p left shoulder sad, acromioplasty, synovectomy, gh debridement, capsular release on 4/22/24.  1.5 weeks out - doing well.  His cuff was not repairable intra-operatively. Discussed that he may be a candidate in the future for balloon spacer vs RSA. Postop course reviewed with patient and wife.   PT for AROM and PROM. No work.  Follow up in 4 weeks - consider rtw at that time.

## 2024-05-01 NOTE — HISTORY OF PRESENT ILLNESS
[] : Post Surgical Visit: yes [de-identified] : DOS:  4/22/24 - s/p left shoulder sad, acromioplasty, synovectomy, gh debridement, capsular release.   5/1/24:  follow up left shoulder.  1 POV visit - now 1.5 weeks s/p surgery.  doing well.  pain controlled.  no fevers or chills.   2/14/24: Here for follow up. He reports continued pain in his shoulder. He saw Dr. Khanna and had DELMY with temporary relief.   1/10/24: 59 yo RHD male with left shoulder pain since about 2022. Denies specific injury but states he lifts weights at the gym. He has difficulty raising his arm overhead. There is pain deep in his shoulder. He does have neck injury which causes numbness/tingling down his arm to his hand. He had cervical KIRSTEN in the past. He saw Dr. Cardoso and had shoulder and c-spine MRIs and EMG. He had CSI L shoulder with good but temporary relief. He takes diclofenac.  PMHx: HCM  MRI L shoulder: full thickness supra tear, ac joint arthrosis, bursitis  MRI C-spine:multi level disc bulge and herniation, central and foraminal stenosis  EMG L UE: acute and chronic L C6-7 radiculopathy [FreeTextEntry1] : Left shoulder  [de-identified] : 4/22/2024 [de-identified] : LT SA SAD ACROMIOPLASTY SYNOVECTOMY GHD POSSIBLE BICEPS TENOTOMY VS TENODESIS

## 2024-05-29 ENCOUNTER — APPOINTMENT (OUTPATIENT)
Dept: ORTHOPEDIC SURGERY | Facility: CLINIC | Age: 59
End: 2024-05-29
Payer: COMMERCIAL

## 2024-05-29 VITALS — BODY MASS INDEX: 27.47 KG/M2 | HEIGHT: 67 IN | WEIGHT: 175 LBS

## 2024-05-29 DIAGNOSIS — M75.122 COMPLETE ROTATOR CUFF TEAR OR RUPTURE OF LEFT SHOULDER, NOT SPECIFIED AS TRAUMATIC: ICD-10-CM

## 2024-05-29 PROCEDURE — 99024 POSTOP FOLLOW-UP VISIT: CPT

## 2024-05-29 NOTE — ASSESSMENT
[FreeTextEntry1] : S/p left shoulder sad, acromioplasty, synovectomy, gh debridement, capsular release on 4/22/24.  1.5 weeks out - doing well.  His cuff was not repairable intra-operatively. Discussed that he may be a candidate in the future for balloon spacer vs RSA. PT for AROM and PROM. OK to start strengthening. RTW 6/9/24. RTO 6 weeks.

## 2024-05-29 NOTE — HISTORY OF PRESENT ILLNESS
[de-identified] : DOS:  4/22/24 - s/p left shoulder sad, acromioplasty, synovectomy, gh debridement, capsular release.   5/29/24:  Here for follow up.  He is in PT with improvement.  He has only started light weights.    5/1/24:  follow up left shoulder.  1 POV visit - now 1.5 weeks s/p surgery.  doing well.  pain controlled.  no fevers or chills.   2/14/24: Here for follow up. He reports continued pain in his shoulder. He saw Dr. Khanna and had DELMY with temporary relief.   1/10/24: 59 yo RHD male with left shoulder pain since about 2022. Denies specific injury but states he lifts weights at the gym. He has difficulty raising his arm overhead. There is pain deep in his shoulder. He does have neck injury which causes numbness/tingling down his arm to his hand. He had cervical KIRSTEN in the past. He saw Dr. Cardoso and had shoulder and c-spine MRIs and EMG. He had CSI L shoulder with good but temporary relief. He takes diclofenac.  PMHx: HCM  MRI L shoulder: full thickness supra tear, ac joint arthrosis, bursitis  MRI C-spine:multi level disc bulge and herniation, central and foraminal stenosis  EMG L UE: acute and chronic L C6-7 radiculopathy [] : This patient has had an injection before: no [FreeTextEntry5] : pt is here for PO visit #2 [de-identified] : 4/22/2024 [de-identified] :  LT SA SAD ACROMIOPLASTY SYNOVECTOMY GHD POSSIBLE BICEPS TENOTOMY VS TENODESIS

## 2024-07-10 ENCOUNTER — APPOINTMENT (OUTPATIENT)
Dept: ORTHOPEDIC SURGERY | Facility: CLINIC | Age: 59
End: 2024-07-10

## 2024-08-09 ENCOUNTER — NON-APPOINTMENT (OUTPATIENT)
Age: 59
End: 2024-08-09

## 2024-12-09 ENCOUNTER — APPOINTMENT (OUTPATIENT)
Dept: ORTHOPEDIC SURGERY | Facility: CLINIC | Age: 59
End: 2024-12-09
Payer: COMMERCIAL

## 2024-12-09 DIAGNOSIS — M25.519 PAIN IN UNSPECIFIED SHOULDER: ICD-10-CM

## 2024-12-09 DIAGNOSIS — M75.80 OTHER SHOULDER LESIONS, UNSPECIFIED SHOULDER: ICD-10-CM

## 2024-12-09 PROCEDURE — 99214 OFFICE O/P EST MOD 30 MIN: CPT | Mod: 25

## 2024-12-09 PROCEDURE — 20610 DRAIN/INJ JOINT/BURSA W/O US: CPT | Mod: RT

## 2024-12-09 PROCEDURE — 73030 X-RAY EXAM OF SHOULDER: CPT | Mod: RT

## 2024-12-09 RX ORDER — METHYLPRED ACET/NACL,ISO-OS/PF 40 MG/ML
40 VIAL (ML) INJECTION
Refills: 0 | Status: COMPLETED | OUTPATIENT
Start: 2024-12-09

## 2024-12-09 RX ORDER — LIDOCAINE HYDROCHLORIDE 10 MG/ML
1 INJECTION, SOLUTION INFILTRATION; PERINEURAL
Refills: 0 | Status: COMPLETED | OUTPATIENT
Start: 2024-12-09

## 2024-12-09 RX ORDER — DICLOFENAC SODIUM 20 MG/G
2 SOLUTION TOPICAL 3 TIMES DAILY
Qty: 1 | Refills: 0 | Status: ACTIVE | COMMUNITY
Start: 2024-12-09 | End: 1900-01-01

## 2024-12-09 RX ADMIN — LIDOCAINE HYDROCHLORIDE 4 %: 10 INJECTION, SOLUTION INFILTRATION; PERINEURAL at 00:00

## 2024-12-09 RX ADMIN — METHYLPREDNISOLONE ACETATE 1 MG/ML: 40 INJECTION, SUSPENSION INTRA-ARTICULAR; INTRALESIONAL; INTRAMUSCULAR; SOFT TISSUE at 00:00

## 2024-12-10 RX ORDER — DICLOFENAC SODIUM 75 MG/1
75 TABLET, DELAYED RELEASE ORAL
Qty: 28 | Refills: 0 | Status: ACTIVE | COMMUNITY
Start: 2024-12-10 | End: 1900-01-01

## 2024-12-11 ENCOUNTER — APPOINTMENT (OUTPATIENT)
Dept: ORTHOPEDIC SURGERY | Facility: CLINIC | Age: 59
End: 2024-12-11
Payer: COMMERCIAL

## 2024-12-11 DIAGNOSIS — S46.911A STRAIN OF UNSPECIFIED MUSCLE, FASCIA AND TENDON AT SHOULDER AND UPPER ARM LEVEL, RIGHT ARM, INITIAL ENCOUNTER: ICD-10-CM

## 2024-12-11 DIAGNOSIS — S46.211A STRAIN OF MUSCLE, FASCIA AND TENDON OF OTHER PARTS OF BICEPS, RIGHT ARM, INITIAL ENCOUNTER: ICD-10-CM

## 2024-12-11 PROCEDURE — 99214 OFFICE O/P EST MOD 30 MIN: CPT

## 2024-12-11 RX ORDER — MELOXICAM 15 MG/1
15 TABLET ORAL DAILY
Qty: 30 | Refills: 2 | Status: ACTIVE | COMMUNITY
Start: 2024-12-11 | End: 2025-03-11

## 2025-01-23 ENCOUNTER — APPOINTMENT (OUTPATIENT)
Dept: ORTHOPEDIC SURGERY | Facility: CLINIC | Age: 60
End: 2025-01-23

## 2025-01-24 ENCOUNTER — APPOINTMENT (OUTPATIENT)
Dept: MRI IMAGING | Facility: CLINIC | Age: 60
End: 2025-01-24
Payer: COMMERCIAL

## 2025-01-24 PROCEDURE — 73221 MRI JOINT UPR EXTREM W/O DYE: CPT | Mod: RT

## 2025-01-31 ENCOUNTER — APPOINTMENT (OUTPATIENT)
Dept: ORTHOPEDIC SURGERY | Facility: CLINIC | Age: 60
End: 2025-01-31

## 2025-01-31 PROCEDURE — 99212 OFFICE O/P EST SF 10 MIN: CPT | Mod: 93

## 2025-02-07 ENCOUNTER — APPOINTMENT (OUTPATIENT)
Dept: ORTHOPEDIC SURGERY | Facility: CLINIC | Age: 60
End: 2025-02-07
Payer: COMMERCIAL

## 2025-02-07 DIAGNOSIS — M19.011 PRIMARY OSTEOARTHRITIS, RIGHT SHOULDER: ICD-10-CM

## 2025-02-07 DIAGNOSIS — M75.112 INCOMPLETE ROTATOR CUFF TEAR OR RUPTURE OF LEFT SHOULDER, NOT SPECIFIED AS TRAUMATIC: ICD-10-CM

## 2025-02-07 DIAGNOSIS — M75.111 INCOMPLETE ROTATOR CUFF TEAR OR RUPTURE OF RIGHT SHOULDER, NOT SPECIFIED AS TRAUMATIC: ICD-10-CM

## 2025-02-07 PROCEDURE — 99214 OFFICE O/P EST MOD 30 MIN: CPT

## (undated) DEVICE — TUBING LINVATEC ARTHROSCOPY IN/OUTFLOW

## (undated) DEVICE — DRAPE SURGICAL #1010

## (undated) DEVICE — DRAPE IOBAN 23" X 17"

## (undated) DEVICE — SUT MONOCRYL 3-0 27" PS-2 UNDYED

## (undated) DEVICE — GLV 8 PROTEXIS (BLUE)

## (undated) DEVICE — VENODYNE/SCD SLEEVE CALF MEDIUM

## (undated) DEVICE — DRAPE U 47X51" LF STERILE

## (undated) DEVICE — CANNULA ARTHREX CRYSTAL 5.75MMX7MM ORANGE

## (undated) DEVICE — GLV 8 PROTEXIS (WHITE)

## (undated) DEVICE — SOL IRR LR 3000ML

## (undated) DEVICE — DRSG MCCONNELL ARM WRAP LG

## (undated) DEVICE — PACK KNEE ARTHROSCOPY

## (undated) DEVICE — SHAVER BLADE S&N INCISOR PLUS 4.5MM STRAIGHT (VIOLET)

## (undated) DEVICE — DRAPE 3/4 SHEET W REINFORCEMENT 56X77"

## (undated) DEVICE — WARMING BLANKET LOWER ADULT

## (undated) DEVICE — S&N ARTHROCARE WAND COBLATION WEREWOLF FLOW 50

## (undated) DEVICE — ARTHREX MULTIFIRE SCORPION NEEDLE

## (undated) DEVICE — DRAPE SPLIT SHEET 77" X 120"

## (undated) DEVICE — NDL SPINAL 18G X 3.5" (PINK)

## (undated) DEVICE — CANNULA ARTHREX TWIST IN NO SQUIRT CAP 7X7 PURPLE

## (undated) DEVICE — BUR S&N STONECUTTER ELITE 4MM STRAIGHT (MAROON)